# Patient Record
Sex: FEMALE | Race: WHITE | NOT HISPANIC OR LATINO | Employment: FULL TIME | URBAN - METROPOLITAN AREA
[De-identification: names, ages, dates, MRNs, and addresses within clinical notes are randomized per-mention and may not be internally consistent; named-entity substitution may affect disease eponyms.]

---

## 2018-03-07 ENCOUNTER — HISTORICAL (OUTPATIENT)
Dept: LAB | Facility: HOSPITAL | Age: 48
End: 2018-03-07

## 2018-03-07 LAB
ABS NEUT (OLG): 9.19 X10(3)/MCL (ref 2.1–9.2)
ALBUMIN SERPL-MCNC: 4.2 GM/DL (ref 3.4–5)
ALBUMIN/GLOB SERPL: 1.4 {RATIO}
ALP SERPL-CCNC: 111 UNIT/L (ref 38–126)
ALT SERPL-CCNC: 12 UNIT/L (ref 12–78)
APTT PPP: 31.2 SECOND(S) (ref 24.8–36.9)
AST SERPL-CCNC: 4 UNIT/L (ref 15–37)
BASOPHILS # BLD AUTO: 0 X10(3)/MCL (ref 0–0.2)
BASOPHILS NFR BLD AUTO: 0 %
BILIRUB SERPL-MCNC: 0.6 MG/DL (ref 0.2–1)
BILIRUBIN DIRECT+TOT PNL SERPL-MCNC: 0.2 MG/DL (ref 0–0.2)
BILIRUBIN DIRECT+TOT PNL SERPL-MCNC: 0.4 MG/DL (ref 0–0.8)
BUN SERPL-MCNC: 13 MG/DL (ref 7–18)
CALCIUM SERPL-MCNC: 9.7 MG/DL (ref 8.5–10.1)
CHLORIDE SERPL-SCNC: 103 MMOL/L (ref 98–107)
CO2 SERPL-SCNC: 29 MMOL/L (ref 21–32)
CREAT SERPL-MCNC: 0.75 MG/DL (ref 0.55–1.02)
EOSINOPHIL # BLD AUTO: 0.5 X10(3)/MCL (ref 0–0.9)
EOSINOPHIL NFR BLD AUTO: 3 %
ERYTHROCYTE [DISTWIDTH] IN BLOOD BY AUTOMATED COUNT: 15.1 % (ref 11.5–17)
GLOBULIN SER-MCNC: 3 GM/DL (ref 2.4–3.5)
GLUCOSE SERPL-MCNC: 109 MG/DL (ref 74–106)
HCT VFR BLD AUTO: 47 % (ref 37–47)
HGB BLD-MCNC: 16 GM/DL (ref 12–16)
INR PPP: 0.99 (ref 0–1.27)
LYMPHOCYTES # BLD AUTO: 3.8 X10(3)/MCL (ref 0.6–4.6)
LYMPHOCYTES NFR BLD AUTO: 26 %
MCH RBC QN AUTO: 28.8 PG (ref 27–31)
MCHC RBC AUTO-ENTMCNC: 34 GM/DL (ref 33–36)
MCV RBC AUTO: 84.7 FL (ref 80–94)
MONOCYTES # BLD AUTO: 0.8 X10(3)/MCL (ref 0.1–1.3)
MONOCYTES NFR BLD AUTO: 6 %
NEUTROPHILS # BLD AUTO: 9.19 X10(3)/MCL (ref 2.1–9.2)
NEUTROPHILS NFR BLD AUTO: 64 %
PLATELET # BLD AUTO: 308 X10(3)/MCL (ref 130–400)
PMV BLD AUTO: 9.1 FL (ref 9.4–12.4)
POTASSIUM SERPL-SCNC: 4 MMOL/L (ref 3.5–5.1)
PROT SERPL-MCNC: 7.2 GM/DL (ref 6.4–8.2)
PROTHROMBIN TIME: 13.4 SECOND(S) (ref 12.2–14.7)
RBC # BLD AUTO: 5.55 X10(6)/MCL (ref 4.2–5.4)
SODIUM SERPL-SCNC: 139 MMOL/L (ref 136–145)
WBC # SPEC AUTO: 14.4 X10(3)/MCL (ref 4.5–11.5)

## 2018-03-13 ENCOUNTER — HISTORICAL (OUTPATIENT)
Dept: ADMINISTRATIVE | Facility: HOSPITAL | Age: 48
End: 2018-03-13

## 2018-06-24 ENCOUNTER — HOSPITAL ENCOUNTER (OUTPATIENT)
Dept: MEDSURG UNIT | Facility: HOSPITAL | Age: 48
End: 2018-06-25
Attending: INTERNAL MEDICINE | Admitting: INTERNAL MEDICINE

## 2018-08-21 ENCOUNTER — TELEPHONE (OUTPATIENT)
Dept: GASTROENTEROLOGY | Facility: CLINIC | Age: 48
End: 2018-08-21

## 2018-08-30 ENCOUNTER — OFFICE VISIT (OUTPATIENT)
Dept: GASTROENTEROLOGY | Facility: CLINIC | Age: 48
End: 2018-08-30
Payer: COMMERCIAL

## 2018-08-30 VITALS — WEIGHT: 211 LBS

## 2018-08-30 DIAGNOSIS — K52.9 CHRONIC DIARRHEA: Primary | ICD-10-CM

## 2018-08-30 DIAGNOSIS — K86.89 PANCREATIC INSUFFICIENCY: ICD-10-CM

## 2018-08-30 DIAGNOSIS — R10.13 EPIGASTRIC PAIN: ICD-10-CM

## 2018-08-30 PROCEDURE — 99999 PR PBB SHADOW E&M-EST. PATIENT-LVL III: CPT | Mod: PBBFAC,,,

## 2018-08-30 PROCEDURE — 99205 OFFICE O/P NEW HI 60 MIN: CPT | Mod: S$GLB,,, | Performed by: INTERNAL MEDICINE

## 2018-08-30 RX ORDER — METOCLOPRAMIDE 10 MG/1
10 TABLET ORAL 4 TIMES DAILY
COMMUNITY

## 2018-08-30 RX ORDER — OMEPRAZOLE 40 MG/1
40 CAPSULE, DELAYED RELEASE ORAL DAILY
COMMUNITY

## 2018-08-30 RX ORDER — CHOLESTYRAMINE 4 G/9G
4 POWDER, FOR SUSPENSION ORAL
Qty: 270 PACKET | Refills: 3 | Status: SHIPPED | OUTPATIENT
Start: 2018-08-30 | End: 2019-08-30

## 2018-08-30 RX ORDER — DILTIAZEM HYDROCHLORIDE 120 MG/1
180 CAPSULE, COATED, EXTENDED RELEASE ORAL DAILY
COMMUNITY

## 2018-08-30 RX ORDER — HYOSCYAMINE SULFATE 0.12 MG/1
0.12 TABLET SUBLINGUAL EVERY 4 HOURS PRN
COMMUNITY
End: 2022-11-08 | Stop reason: CLARIF

## 2018-08-30 RX ORDER — PROMETHAZINE HYDROCHLORIDE 25 MG/1
25 SUPPOSITORY RECTAL EVERY 12 HOURS PRN
COMMUNITY

## 2018-08-30 RX ORDER — QUETIAPINE 200 MG/1
200 TABLET, FILM COATED, EXTENDED RELEASE ORAL DAILY
COMMUNITY

## 2018-08-30 RX ORDER — FENTANYL 75 UG/H
1 PATCH TRANSDERMAL
COMMUNITY
End: 2022-11-08 | Stop reason: CLARIF

## 2018-08-30 RX ORDER — GLIPIZIDE 5 MG/1
5 TABLET ORAL
COMMUNITY
End: 2019-06-27

## 2018-08-30 RX ORDER — SERTRALINE HYDROCHLORIDE 25 MG/1
25 TABLET, FILM COATED ORAL DAILY
COMMUNITY

## 2018-08-30 RX ORDER — LOSARTAN POTASSIUM AND HYDROCHLOROTHIAZIDE 12.5; 1 MG/1; MG/1
1 TABLET ORAL DAILY
COMMUNITY

## 2018-08-30 RX ORDER — HYDROXYZINE PAMOATE 100 MG/1
100 CAPSULE ORAL 4 TIMES DAILY
COMMUNITY
End: 2019-06-27

## 2018-08-30 NOTE — PROGRESS NOTES
SUBJECTIVE:         Chief Complaint:   Chief Complaint   Patient presents with    Pancreatitis    Abdominal Pain       History of Present Illness:  Patient is a 48 y.o. female presents with abdominal  Pain and diarreha. Her  is with her to  Augment her history.  The symptoms include watery diarrhea that happens as soon as she eats. She can have 5-6 loose watery stools a day.  On occasion she has awoke with a stooling accident.   Onset of symptoms was abrupt starting 1 year ago with unchanged course since that time. Previous studies include CT scan, ultrasound, upper GI and endoscopic US. There have been a myriad of tests done performed.  The patient is aware of some of them but nor compeltely aware of all.  I have some records form clinic visits scanned in but many of the hospital results I don't have.     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 She has been on pancreas supplements for a while with very little improvement.  She had her gallbladder removed 15 years ago and recalls diarrhea for a long time.  She has a spinal stimulator that has been in for 5 and a half years, that life span of which is actually 5 years.  The purpose of the stimulator is to help her with her rectal/ anal tone.            (Not in a hospital admission)    Review of patient's allergies indicates:   Allergen Reactions    Cephalosporins     Demerol [meperidine]     Penicillins     Toradol [ketorolac]     Tramadol         Past Medical History:   Diagnosis Date    Abdominal pain     Acute pancreatitis     Delayed gastric emptying     Duodenitis      Past Surgical History:   Procedure Laterality Date    COLONOSCOPY      UPPER ENDOSCOPIC ULTRASOUND W/ FNA      UPPER GASTROINTESTINAL ENDOSCOPY       Family History   Problem Relation Age of Onset    Lung cancer Maternal Aunt     Colon cancer Maternal Uncle     Prostate cancer Maternal Uncle       Social History     Tobacco Use    Smoking status: Former Smoker     Packs/day: 3.00     Years: 35.00     Pack years: 105.00     Types: Cigarettes     Last attempt to quit: 2018     Years since quittin.1    Smokeless tobacco: Never Used   Substance Use Topics    Alcohol use: No     Frequency: Never    Drug use: No       Review of Systems:  A complete review of systems was performed and other than described in the HPI and below is negative       OBJECTIVE:     Vital Signs (Most Recent)         Diagnostic Results:  I reviwed numerous records      ASSESSMENT/PLAN:   Abdominal pain and diarrhea for over a year    I had a long discussion with the patient and family about what sort of records need to be reviewed by me as well as studies that have not yet been completed by her recollection.    We decided the best course of action would be to have her send records to me for review and come back to clinic in a month.  In the mean time I've started her on cholestyramine and have asked her to get a gastric emptying study    She also needs to contact her surgeon to  Discuss updating her spinal stimulator    I spent 40 minutes with the patient., over 50% of it in coordinating care and counseling the patient

## 2018-08-30 NOTE — PATIENT INSTRUCTIONS
Come back in a month    Get records from [facility] and mail to me    Start Cholestyramine    CT scan on a disk    Gastric emptying study to assess stomach emptying

## 2018-10-15 ENCOUNTER — TELEPHONE (OUTPATIENT)
Dept: GASTROENTEROLOGY | Facility: CLINIC | Age: 48
End: 2018-10-15

## 2018-10-15 NOTE — TELEPHONE ENCOUNTER
Follow up appointment scheduled for 11/15 at 900am with Dr. Murillo.  Patient reports records requested  From [facility] costs over $1000. Will discuss with provider during appointment.   DISPLAY PLAN FREE TEXT DISPLAY PLAN FREE TEXT DISPLAY PLAN FREE TEXT

## 2018-11-15 ENCOUNTER — OFFICE VISIT (OUTPATIENT)
Dept: GASTROENTEROLOGY | Facility: CLINIC | Age: 48
End: 2018-11-15
Payer: COMMERCIAL

## 2018-11-15 VITALS — WEIGHT: 216.5 LBS | SYSTOLIC BLOOD PRESSURE: 112 MMHG | HEART RATE: 97 BPM | DIASTOLIC BLOOD PRESSURE: 69 MMHG

## 2018-11-15 DIAGNOSIS — K52.9 CHRONIC DIARRHEA: Primary | ICD-10-CM

## 2018-11-15 DIAGNOSIS — K30 DELAYED GASTRIC EMPTYING: ICD-10-CM

## 2018-11-15 DIAGNOSIS — R10.13 EPIGASTRIC PAIN: ICD-10-CM

## 2018-11-15 PROCEDURE — 99999 PR PBB SHADOW E&M-EST. PATIENT-LVL III: CPT | Mod: PBBFAC,,,

## 2018-11-15 PROCEDURE — 99214 OFFICE O/P EST MOD 30 MIN: CPT | Mod: S$GLB,,, | Performed by: INTERNAL MEDICINE

## 2018-11-15 NOTE — PROGRESS NOTES
Subjective:       Patient ID: Norma Troy is a 48 y.o. female.    Chief Complaint: Follow-up (loose stool)    Previous records reviewed from those uploaded.  Multiple pages of records personally reviewed by me.    Her pain is now tolerable.  It flares up periodically, can ge to be a 8/10 but has only caused one trip to the ED over the last 6 months.  She recalls now she has been diagnosed with gastroparesis.     Her bowel habits have improved.  She has 1-2 soft bowel movements with no accidents.  She has only an occasional loose stool. This has been all since she started the cholestyramine.    Her past workup included numerous EGD's as well as EUS.  These shoed duodenal inflammation suggesting groove pancreatitis. Other  Imaging suggest the same. She's had a colonoscopy with biopsies for microscopic colitis that were normal        Review of Systems    Objective:      Physical Exam    Assessment:       1. Chronic diarrhea    2. Delayed gastric emptying    3. Epigastric pain        Plan:       She appears to have a combination of gastroparesis and bile salt diarrhea form her cholecystectomy.  Her symptoms appear to be improved with diet observation and restriction and cholestyramine.     She has had a screening colonoscopy.  She would like to follow up with us so we'll have her return in 6 months.  I spent 25 minutes with the patient., over 50% of it in coordinating care and counseling the patient

## 2019-06-27 ENCOUNTER — OFFICE VISIT (OUTPATIENT)
Dept: GASTROENTEROLOGY | Facility: CLINIC | Age: 49
End: 2019-06-27
Payer: COMMERCIAL

## 2019-06-27 VITALS — WEIGHT: 235 LBS

## 2019-06-27 DIAGNOSIS — R10.13 EPIGASTRIC PAIN: Primary | ICD-10-CM

## 2019-06-27 DIAGNOSIS — K90.89 BILE SALT-INDUCED DIARRHEA: ICD-10-CM

## 2019-06-27 PROCEDURE — 99999 PR PBB SHADOW E&M-EST. PATIENT-LVL III: CPT | Mod: PBBFAC,,,

## 2019-06-27 PROCEDURE — 99999 PR PBB SHADOW E&M-EST. PATIENT-LVL III: ICD-10-PCS | Mod: PBBFAC,,,

## 2019-06-27 PROCEDURE — 99214 PR OFFICE/OUTPT VISIT, EST, LEVL IV, 30-39 MIN: ICD-10-PCS | Mod: S$GLB,,, | Performed by: INTERNAL MEDICINE

## 2019-06-27 PROCEDURE — 99214 OFFICE O/P EST MOD 30 MIN: CPT | Mod: S$GLB,,, | Performed by: INTERNAL MEDICINE

## 2019-06-27 RX ORDER — HUMAN INSULIN 100 [USP'U]/ML
INJECTION, SUSPENSION SUBCUTANEOUS DAILY
Refills: 2 | COMMUNITY
Start: 2019-04-01

## 2019-06-27 RX ORDER — ONDANSETRON 8 MG/1
TABLET, ORALLY DISINTEGRATING ORAL
Refills: 3 | COMMUNITY
Start: 2019-03-28

## 2019-06-27 NOTE — PROGRESS NOTES
Subjective:       Patient ID: Norma Troy is a 49 y.o. female.    Chief Complaint: Abdominal Pain    She's doing pretty well with her gastroparesis.  She has not been hospitalized in a year which is a significant improvement. She's only had one ED visit.  She now takes the cholestyramine periodically and is having good results    Recently she had a slight change in her abdominal pain with a transistion of the pain to her left side.    Her A1c still remains a challenge and she is having blood glucose issues for which her endocrinologist is helping manage     Review of Systems    Objective:      Physical Exam    Assessment:       1. Epigastric pain    2. Bile salt-induced diarrhea        Plan:       Persistent abdominal pain with a transition of the pain.  Her A1c is worse and this portends more and persistent trouble with her gastroparesis  We'll proceed with an EGD    Bile salt diarrhea is currently under adequate control.    Her CRC screening is up to date.    I spent 25 minutes with the patient., over 50% of it in coordinating care and counseling the patient

## 2019-06-27 NOTE — PATIENT INSTRUCTIONS
Endoscopy has been scheduled for 7/19/19 with Dr. Elliott.  The lab will call two days before with arrival time.  The test will take place at [facility] [address]. Check in at the first floor at Hospital Admit Desk.  Nothing to eat or drink after midnight the night before.  Only take heart, blood pressure or seizure medications the morning of the procedure.  Do NOT take medications that contain aspirin or ibuprofen.  It is ok to take 81mg baby aspirin.  Please contact the office with any questions.

## 2019-07-14 ENCOUNTER — HISTORICAL (OUTPATIENT)
Dept: EMERGENCY MEDICINE | Facility: HOSPITAL | Age: 49
End: 2019-07-14

## 2019-07-14 LAB
ABS NEUT (OLG): 4.36 X10(3)/MCL (ref 2.1–9.2)
ALBUMIN SERPL-MCNC: 3.8 GM/DL (ref 3.4–5)
ALBUMIN/GLOB SERPL: 1.3 RATIO (ref 1.1–2)
ALP SERPL-CCNC: 129 UNIT/L (ref 46–116)
ALT SERPL-CCNC: 18 UNIT/L (ref 12–78)
AST SERPL-CCNC: 17 UNIT/L (ref 15–37)
BASOPHILS # BLD AUTO: 0 X10(3)/MCL (ref 0–0.2)
BASOPHILS NFR BLD AUTO: 0 %
BILIRUB SERPL-MCNC: 0.4 MG/DL (ref 0.2–1)
BILIRUBIN DIRECT+TOT PNL SERPL-MCNC: 0.08 MG/DL (ref 0–0.2)
BILIRUBIN DIRECT+TOT PNL SERPL-MCNC: 0.32 MG/DL (ref 0–0.8)
BUN SERPL-MCNC: 15.3 MG/DL (ref 7–18)
CALCIUM SERPL-MCNC: 9.3 MG/DL (ref 8.5–10.1)
CHLORIDE SERPL-SCNC: 105 MMOL/L (ref 98–107)
CO2 SERPL-SCNC: 31.1 MMOL/L (ref 21–32)
CREAT SERPL-MCNC: 1.1 MG/DL (ref 0.6–1.3)
EOSINOPHIL # BLD AUTO: 0.3 X10(3)/MCL (ref 0–0.9)
EOSINOPHIL NFR BLD AUTO: 3 %
ERYTHROCYTE [DISTWIDTH] IN BLOOD BY AUTOMATED COUNT: 15.1 % (ref 11.5–17)
GLOBULIN SER-MCNC: 3 GM/DL (ref 2.4–3.5)
GLUCOSE SERPL-MCNC: 132 MG/DL (ref 74–106)
HCT VFR BLD AUTO: 48.2 % (ref 37–47)
HGB BLD-MCNC: 15.4 GM/DL (ref 12–16)
LIPASE SERPL-CCNC: 46 UNIT/L (ref 73–393)
LYMPHOCYTES # BLD AUTO: 3.8 X10(3)/MCL (ref 0.6–4.6)
LYMPHOCYTES NFR BLD AUTO: 41 %
MCH RBC QN AUTO: 29.1 PG (ref 27–31)
MCHC RBC AUTO-ENTMCNC: 32 GM/DL (ref 33–36)
MCV RBC AUTO: 90.9 FL (ref 80–94)
MONOCYTES # BLD AUTO: 0.7 X10(3)/MCL (ref 0.1–1.3)
MONOCYTES NFR BLD AUTO: 8 %
NEUTROPHILS # BLD AUTO: 4.36 X10(3)/MCL (ref 1.4–7.9)
NEUTROPHILS NFR BLD AUTO: 48 %
PLATELET # BLD AUTO: 237 X10(3)/MCL (ref 130–400)
PMV BLD AUTO: 9.6 FL (ref 9.4–12.4)
POTASSIUM SERPL-SCNC: 4.3 MMOL/L (ref 3.5–5.1)
PROT SERPL-MCNC: 6.8 GM/DL (ref 6.4–8.2)
RBC # BLD AUTO: 5.3 X10(6)/MCL (ref 4.2–5.4)
SODIUM SERPL-SCNC: 145 MMOL/L (ref 136–145)
TROPONIN I SERPL-MCNC: <0.02 NG/ML (ref 0.02–0.06)
WBC # SPEC AUTO: 9.2 X10(3)/MCL (ref 4.5–11.5)

## 2019-07-17 ENCOUNTER — TELEPHONE (OUTPATIENT)
Dept: ENDOSCOPY | Facility: HOSPITAL | Age: 49
End: 2019-07-17

## 2019-07-17 NOTE — TELEPHONE ENCOUNTER
Spoke with patient about arrival time @ 0930.     NPO status reviewed: Patient must have nothing to eat after midnight.  Pt may have CLEAR liquids ONLY until completely NPO 3 hrs @ 0630.    Medications: Do not take Insulin or oral diabetic medications the day of the procedure.  Take as prescribed: heart, seizure and blood pressure medication in the morning with a sip of water (less than an ounce).  Take any breathing medications and bring inhalers to hospital with you Leave all valuables and jewelry at home.     Wear comfortable clothes to procedure to change into hospital gown You cannot drive for 24 hours after your procedure because you will receive sedation for your procedure to make you comfortable.  A ride must be provided at discharge.

## 2019-07-19 ENCOUNTER — HOSPITAL ENCOUNTER (OUTPATIENT)
Facility: HOSPITAL | Age: 49
Discharge: HOME | End: 2019-07-19
Attending: INTERNAL MEDICINE | Admitting: INTERNAL MEDICINE
Payer: COMMERCIAL

## 2019-07-19 ENCOUNTER — ANESTHESIA EVENT (OUTPATIENT)
Dept: ENDOSCOPY | Facility: HOSPITAL | Age: 49
End: 2019-07-19
Payer: COMMERCIAL

## 2019-07-19 ENCOUNTER — ANESTHESIA (OUTPATIENT)
Dept: ENDOSCOPY | Facility: HOSPITAL | Age: 49
End: 2019-07-19
Payer: COMMERCIAL

## 2019-07-19 DIAGNOSIS — K86.89 PANCREATIC INSUFFICIENCY: Primary | ICD-10-CM

## 2019-07-19 DIAGNOSIS — K30 DELAYED GASTRIC EMPTYING: ICD-10-CM

## 2019-07-19 DIAGNOSIS — R10.9 ABDOMINAL PAIN: ICD-10-CM

## 2019-07-19 LAB — POCT GLUCOSE: 114 MG/DL (ref 70–110)

## 2019-07-19 PROCEDURE — 37000008 HC ANESTHESIA 1ST 15 MINUTES: Performed by: INTERNAL MEDICINE

## 2019-07-19 PROCEDURE — 63600175 PHARM REV CODE 636 W HCPCS: Performed by: NURSE ANESTHETIST, CERTIFIED REGISTERED

## 2019-07-19 PROCEDURE — 37000009 HC ANESTHESIA EA ADD 15 MINS: Performed by: INTERNAL MEDICINE

## 2019-07-19 PROCEDURE — 82962 GLUCOSE BLOOD TEST: CPT | Performed by: INTERNAL MEDICINE

## 2019-07-19 PROCEDURE — 43235 EGD DIAGNOSTIC BRUSH WASH: CPT | Mod: ,,, | Performed by: INTERNAL MEDICINE

## 2019-07-19 PROCEDURE — 43235 EGD DIAGNOSTIC BRUSH WASH: CPT | Performed by: INTERNAL MEDICINE

## 2019-07-19 PROCEDURE — 25000003 PHARM REV CODE 250: Performed by: INTERNAL MEDICINE

## 2019-07-19 PROCEDURE — 43235 PR EGD, FLEX, DIAGNOSTIC: ICD-10-PCS | Mod: ,,, | Performed by: INTERNAL MEDICINE

## 2019-07-19 RX ORDER — LIDOCAINE HCL/PF 100 MG/5ML
SYRINGE (ML) INTRAVENOUS
Status: DISCONTINUED | OUTPATIENT
Start: 2019-07-19 | End: 2019-07-19

## 2019-07-19 RX ORDER — PROPOFOL 10 MG/ML
VIAL (ML) INTRAVENOUS CONTINUOUS PRN
Status: DISCONTINUED | OUTPATIENT
Start: 2019-07-19 | End: 2019-07-19

## 2019-07-19 RX ORDER — SODIUM CHLORIDE 0.9 % (FLUSH) 0.9 %
10 SYRINGE (ML) INJECTION
Status: DISCONTINUED | OUTPATIENT
Start: 2019-07-19 | End: 2019-07-19 | Stop reason: HOSPADM

## 2019-07-19 RX ORDER — PROPOFOL 10 MG/ML
VIAL (ML) INTRAVENOUS
Status: DISCONTINUED | OUTPATIENT
Start: 2019-07-19 | End: 2019-07-19

## 2019-07-19 RX ORDER — SODIUM CHLORIDE 9 MG/ML
INJECTION, SOLUTION INTRAVENOUS CONTINUOUS
Status: DISCONTINUED | OUTPATIENT
Start: 2019-07-19 | End: 2019-07-19 | Stop reason: HOSPADM

## 2019-07-19 RX ADMIN — PROPOFOL 100 MG: 10 INJECTION, EMULSION INTRAVENOUS at 10:07

## 2019-07-19 RX ADMIN — LIDOCAINE HYDROCHLORIDE 100 MG: 20 INJECTION, SOLUTION INTRAVENOUS at 10:07

## 2019-07-19 RX ADMIN — PROPOFOL 150 MCG/KG/MIN: 10 INJECTION, EMULSION INTRAVENOUS at 10:07

## 2019-07-19 RX ADMIN — SODIUM CHLORIDE: 0.9 INJECTION, SOLUTION INTRAVENOUS at 10:07

## 2019-07-19 NOTE — ANESTHESIA PREPROCEDURE EVALUATION
07/19/2019  Norma Moise is a 49 y.o., female for EGD under MAC    Past Medical History:   Diagnosis Date    Abdominal pain     Acute pancreatitis     Delayed gastric emptying     Duodenitis    smoker      Anesthesia Evaluation    I have reviewed the Patient Summary Reports.    I have reviewed the Nursing Notes.   I have reviewed the Medications.     Review of Systems  Social:  Smoker        Physical Exam  General:  Morbid Obesity    Airway/Jaw/Neck:  Airway Findings: Mallampati: II      Chest/Lungs:  Chest/Lungs Clear    Heart/Vascular:  Heart Findings: Normal            Anesthesia Plan  Type of Anesthesia, risks & benefits discussed:  Anesthesia Type:  MAC  Patient's Preference:   Intra-op Monitoring Plan:   Intra-op Monitoring Plan Comments:   Post Op Pain Control Plan:   Post Op Pain Control Plan Comments:   Induction:    Beta Blocker:  Patient is on a Beta-Blocker and has received one dose within the past 24 hours (No further documentation required).       Informed Consent: Patient understands risks and agrees with Anesthesia plan.  Questions answered. Anesthesia consent signed with patient.  ASA Score: 3     Day of Surgery Review of History & Physical:            Ready For Surgery From Anesthesia Perspective.

## 2019-07-19 NOTE — [employer] PATIENT INSTRUCTIONS
Discharge Summary/Instructions after an Endoscopic Procedure  Patient Name: Norma Moise  Patient MRN: [id number]  Patient YOB: 1970 Friday, July 19, 2019  Satnam Murillo MD  RESTRICTIONS:  During your procedure today, you received medications for sedation.  These   medications may affect your judgment, balance and coordination.  Therefore,   for 24 hours, you have the following restrictions:   - DO NOT drive a car, operate machinery, make legal/financial decisions,   sign important papers or drink alcohol.    ACTIVITY:  Today: no heavy lifting, straining or running due to procedural   sedation/anesthesia.  The following day: return to full activity including work.  DIET:  Eat and drink normally unless instructed otherwise.     TREATMENT FOR COMMON SIDE EFFECTS:  - Mild abdominal pain, nausea, belching, bloating or excessive gas:  rest,   eat lightly and use a heating pad.  - Sore Throat: treat with throat lozenges and/or gargle with warm salt   water.  - Because air was used during the procedure, expelling large amounts of air   from your rectum or belching is normal.  - If a bowel prep was taken, you may not have a bowel movement for 1-3 days.    This is normal.  SYMPTOMS TO WATCH FOR AND REPORT TO YOUR PHYSICIAN:  1. Abdominal pain or bloating, other than gas cramps.  2. Chest pain.  3. Back pain.  4. Signs of infection such as: chills or fever occurring within 24 hours   after the procedure.  5. Rectal bleeding, which would show as bright red, maroon, or black stools.   (A tablespoon of blood from the rectum is not serious, especially if   hemorrhoids are present.)  6. Vomiting.  7. Weakness or dizziness.  GO DIRECTLY TO THE NEAREST EMERGENCY ROOM IF YOU HAVE ANY OF THE FOLLOWING:      Difficulty breathing              Chills and/or fever over 101 F   Persistent vomiting and/or vomiting blood   Severe abdominal pain   Severe chest pain   Black, tarry stools   Bleeding- more than one tablespoon   Any  other symptom or condition that you feel may need urgent attention  Your doctor recommends these additional instructions:  If any biopsies were taken, your doctors clinic will contact you in 1 to 2   weeks with any results.  - Discharge patient to home.   - Resume previous diet.   - Continue present medications.   - Consider performing CT scan (computed tomography) of the abdomen with   contrast if symptoms persist.  For questions, problems or results please call your physician - Satnam Murillo MD at Work:  [phone].  EMERGENCY PHONE NUMBER: [phone],  LAB RESULTS: [phone]  IF A COMPLICATION OR EMERGENCY SITUATION ARISES AND YOU ARE UNABLE TO REACH   YOUR PHYSICIAN - GO DIRECTLY TO THE EMERGENCY ROOM.  Satnam Murillo MD  7/19/2019 10:42:16 AM  This report has been verified and signed electronically.  [employer]

## 2019-07-19 NOTE — ANESTHESIA POSTPROCEDURE EVALUATION
Anesthesia Post Evaluation    Patient: Norma Moise    Procedure(s) Performed: Procedure(s) (LRB):  EGD (ESOPHAGOGASTRODUODENOSCOPY) (N/A)    Final Anesthesia Type: MAC  Patient location during evaluation: GI PACU  Patient participation: Yes- Able to Participate  Level of consciousness: awake and alert and oriented  Post-procedure vital signs: reviewed and stable  Pain management: adequate  Airway patency: patent  PONV status at discharge: No PONV  Anesthetic complications: no      Cardiovascular status: blood pressure returned to baseline  Respiratory status: unassisted, room air and spontaneous ventilation  Hydration status: euvolemic  Follow-up not needed.          Vitals Value Taken Time   /61 7/19/2019  9:48 AM   Temp 37.1 °C (98.8 °F) 7/19/2019  9:48 AM   Pulse 73 7/19/2019  9:48 AM   Resp 16 7/19/2019  9:48 AM   SpO2 96 % 7/19/2019  9:48 AM         No case tracking events are documented in the log.      Pain/Dorothy Score: No data recorded

## 2019-07-19 NOTE — PLAN OF CARE
Admission process complete. Patient ready for procedure. Plan of care reviewed with patient and her spouse. Preoperative fasting appropriate for procedure and sedation. Call light in reach and bed in lowest position.

## 2019-07-19 NOTE — H&P
Short Stay Endoscopy History and Physical    PCP - Uriel Morales MD  Referring Physician - [facility]  No address on file    Procedure - egd  ASA - per anesthesia  Mallampati - per anesthesia  History of Anesthesia problems - no  Family history Anesthesia problems -  no   Plan of anesthesia - General    HPI:  This is a 49 y.o. female here for evaluation of: abdominal pain    Reflux - no  Dysphagia - no  Abdominal pain - no  Diarrhea - no    ROS:  Constitutional: No fevers, chills, No weight loss  CV: No chest pain  Pulm: No cough, No shortness of breath  Ophtho: No vision changes  GI: see HPI  Derm: No rash    Medical History:  has a past medical history of Abdominal pain, Acute pancreatitis, Delayed gastric emptying, and Duodenitis.    Surgical History:  has a past surgical history that includes Colonoscopy; Upper gastrointestinal endoscopy; Upper endoscopic ultrasound w/ FNA; Reconstruction of shoulder (Right); Tonsillectomy; Adenoidectomy; Uvulectomy; Cholecystectomy; Appendectomy; Hysterectomy; Cystoscopy with suprapubic bladder sling; Rectal prolapse repair; Carpal tunnel release; Knee arthroscopy w/ meniscal repair (Left); and Revision of augmentation of bladder.    Family History: family history includes Colon cancer in her maternal uncle; Lung cancer in her maternal aunt; Prostate cancer in her maternal uncle..    Social History:  reports that she has been smoking cigarettes.  She has a 105.00 pack-year smoking history. She has never used smokeless tobacco. She reports that she does not drink alcohol or use drugs.    Review of patient's allergies indicates:   Allergen Reactions    Cephalosporins     Demerol [meperidine]     Penicillins     Toradol [ketorolac]     Tramadol        Medications:   Medications Prior to Admission   Medication Sig Dispense Refill Last Dose    cholestyramine (QUESTRAN) 4 gram packet Take 1 packet (4 g total) by mouth 3 (three) times daily with meals. 270 packet 3  Past Month at Unknown time    diltiaZEM (CARDIZEM CD) 120 MG Cp24 Take 180 mg by mouth once daily.   7/18/2019 at Unknown time    hyoscyamine (LEVSIN/SL) 0.125 mg Subl Place 0.125 mg under the tongue every 4 (four) hours as needed.   7/18/2019 at Unknown time    losartan-hydrochlorothiazide 100-12.5 mg (HYZAAR) 100-12.5 mg Tab Take 1 tablet by mouth once daily.   7/19/2019 at Unknown time    metoclopramide HCl (REGLAN) 10 MG tablet Take 10 mg by mouth 4 (four) times daily.   7/18/2019 at Unknown time    NOVOLIN 70/30 U-100 INSULIN 100 unit/mL (70-30) injection   2 7/18/2019 at Unknown time    omeprazole (PRILOSEC) 40 MG capsule Take 40 mg by mouth once daily.   7/18/2019 at Unknown time    quetiapine 200 mg oral tb24 (SEROQUEL XR) 200 MG Tb24 Take 200 mg by mouth once daily.   7/18/2019 at Unknown time    sertraline (ZOLOFT) 25 MG tablet Take 25 mg by mouth once daily.   7/18/2019 at Unknown time    fentaNYL (DURAGESIC) 75 mcg/hr Place 1 patch onto the skin every 72 hours.   7/17/2019    lipase-protease-amylase (CREON) 36,000-114,000- 180,000 unit CpDR Take by mouth 3 (three) times daily.   More than a month at Unknown time    ondansetron (ZOFRAN-ODT) 8 MG TbDL as needed.  3 7/14/2019    promethazine (PHENERGAN) 25 MG suppository Place 25 mg rectally every 12 (twelve) hours as needed for Nausea.   More than a month at Unknown time       Physical Exam:    Vital Signs:   Vitals:    07/19/19 0948   BP: 109/61   Pulse: 73   Resp: 16   Temp: 98.8 °F (37.1 °C)       General Appearance: Well appearing in no acute distress    Labs:  No results found for: WBC, HGB, HCT, PLT, CHOL, TRIG, HDL, LDLDIRECT, ALT, AST, NA, K, CL, CREATININE, BUN, CO2, TSH, PSA, INR, GLUF, HGBA1C, MICROALBUR    I have explained the risks and benefits of this endoscopic procedure to the patient including but not limited to bleeding, inflammation, infection, perforation, and death.      Satnam Murillo MD

## 2019-07-19 NOTE — TRANSFER OF CARE
"Anesthesia Transfer of Care Note    Patient: Norma Moise    Procedure(s) Performed: Procedure(s) (LRB):  EGD (ESOPHAGOGASTRODUODENOSCOPY) (N/A)    Patient location: GI    Anesthesia Type: MAC    Transport from OR: Transported from OR on room air with adequate spontaneous ventilation    Post pain: adequate analgesia    Post assessment: no apparent anesthetic complications    Post vital signs: stable    Level of consciousness: awake, alert and oriented    Nausea/Vomiting: no nausea/vomiting    Complications: none    Transfer of care protocol was followed      Last vitals:   Visit Vitals  /61 (BP Location: Left arm, Patient Position: Lying)   Pulse 73   Temp 37.1 °C (98.8 °F) (Temporal)   Resp 16   Ht 5' 4" (1.626 m)   Wt 108 kg (238 lb)   SpO2 96%   Breastfeeding? No   BMI 40.85 kg/m²     "

## 2019-07-22 VITALS
BODY MASS INDEX: 40.63 KG/M2 | TEMPERATURE: 99 F | WEIGHT: 238 LBS | DIASTOLIC BLOOD PRESSURE: 74 MMHG | OXYGEN SATURATION: 96 % | HEART RATE: 68 BPM | SYSTOLIC BLOOD PRESSURE: 117 MMHG | HEIGHT: 64 IN | RESPIRATION RATE: 16 BRPM

## 2019-07-28 ENCOUNTER — HISTORICAL (OUTPATIENT)
Dept: EMERGENCY MEDICINE | Facility: HOSPITAL | Age: 49
End: 2019-07-28

## 2019-07-28 LAB
ABS NEUT (OLG): 4.06 X10(3)/MCL (ref 2.1–9.2)
ALBUMIN SERPL-MCNC: 3.6 GM/DL (ref 3.4–5)
ALBUMIN/GLOB SERPL: 1.1 RATIO (ref 1.1–2)
ALP SERPL-CCNC: 118 UNIT/L (ref 46–116)
ALT SERPL-CCNC: 22 UNIT/L (ref 12–78)
AST SERPL-CCNC: 20 UNIT/L (ref 15–37)
BASOPHILS # BLD AUTO: 0 X10(3)/MCL (ref 0–0.2)
BASOPHILS NFR BLD AUTO: 0 %
BILIRUB SERPL-MCNC: 0.4 MG/DL (ref 0.2–1)
BILIRUBIN DIRECT+TOT PNL SERPL-MCNC: 0.04 MG/DL (ref 0–0.2)
BILIRUBIN DIRECT+TOT PNL SERPL-MCNC: 0.36 MG/DL (ref 0–0.8)
BUN SERPL-MCNC: 11.6 MG/DL (ref 7–18)
CALCIUM SERPL-MCNC: 9.3 MG/DL (ref 8.5–10.1)
CHLORIDE SERPL-SCNC: 103 MMOL/L (ref 98–107)
CO2 SERPL-SCNC: 31.9 MMOL/L (ref 21–32)
CREAT SERPL-MCNC: 0.96 MG/DL (ref 0.6–1.3)
EOSINOPHIL # BLD AUTO: 0.3 X10(3)/MCL (ref 0–0.9)
EOSINOPHIL NFR BLD AUTO: 4 %
ERYTHROCYTE [DISTWIDTH] IN BLOOD BY AUTOMATED COUNT: 14.7 % (ref 11.5–17)
GLOBULIN SER-MCNC: 3.3 GM/DL (ref 2.4–3.5)
GLUCOSE SERPL-MCNC: 140 MG/DL (ref 74–106)
HCT VFR BLD AUTO: 47.3 % (ref 37–47)
HGB BLD-MCNC: 15.1 GM/DL (ref 12–16)
IMM GRANULOCYTES # BLD AUTO: 0.03 % (ref 0–0.02)
IMM GRANULOCYTES NFR BLD AUTO: 0.4 % (ref 0–0.43)
LIPASE SERPL-CCNC: 38 UNIT/L (ref 73–393)
LYMPHOCYTES # BLD AUTO: 2.9 X10(3)/MCL (ref 0.6–4.6)
LYMPHOCYTES NFR BLD AUTO: 36 %
MCH RBC QN AUTO: 28.6 PG (ref 27–31)
MCHC RBC AUTO-ENTMCNC: 31.9 GM/DL (ref 33–36)
MCV RBC AUTO: 89.6 FL (ref 80–94)
MONOCYTES # BLD AUTO: 0.7 X10(3)/MCL (ref 0.1–1.3)
MONOCYTES NFR BLD AUTO: 9 %
NEUTROPHILS # BLD AUTO: 4.06 X10(3)/MCL (ref 1.4–7.9)
NEUTROPHILS NFR BLD AUTO: 51 %
PLATELET # BLD AUTO: 225 X10(3)/MCL (ref 130–400)
PMV BLD AUTO: 9.4 FL (ref 9.4–12.4)
POTASSIUM SERPL-SCNC: 4.1 MMOL/L (ref 3.5–5.1)
PROT SERPL-MCNC: 6.9 GM/DL (ref 6.4–8.2)
RBC # BLD AUTO: 5.28 X10(6)/MCL (ref 4.2–5.4)
SODIUM SERPL-SCNC: 142 MMOL/L (ref 136–145)
WBC # SPEC AUTO: 8 X10(3)/MCL (ref 4.5–11.5)

## 2020-10-02 LAB
ABS NEUT (OLG): 6.97 X10(3)/MCL (ref 2.1–9.2)
ALBUMIN SERPL-MCNC: 4 GM/DL (ref 3.5–5)
ALBUMIN/GLOB SERPL: 1.8 RATIO (ref 1.1–2)
ALP SERPL-CCNC: 145 UNIT/L (ref 40–150)
ALT SERPL-CCNC: 14 UNIT/L (ref 0–55)
AST SERPL-CCNC: 12 UNIT/L (ref 5–34)
BASOPHILS # BLD AUTO: 0 X10(3)/MCL (ref 0–0.2)
BASOPHILS NFR BLD AUTO: 0 %
BILIRUB SERPL-MCNC: 0.3 MG/DL
BILIRUBIN DIRECT+TOT PNL SERPL-MCNC: 0.1 MG/DL (ref 0–0.5)
BILIRUBIN DIRECT+TOT PNL SERPL-MCNC: 0.2 MG/DL (ref 0–0.8)
BUN SERPL-MCNC: 11.4 MG/DL (ref 9.8–20.1)
CALCIUM SERPL-MCNC: 8.6 MG/DL (ref 8.4–10.2)
CHLORIDE SERPL-SCNC: 104 MMOL/L (ref 98–107)
CO2 SERPL-SCNC: 24 MMOL/L (ref 22–29)
CREAT SERPL-MCNC: 0.95 MG/DL (ref 0.55–1.02)
EOSINOPHIL # BLD AUTO: 0.3 X10(3)/MCL (ref 0–0.9)
EOSINOPHIL NFR BLD AUTO: 2 %
ERYTHROCYTE [DISTWIDTH] IN BLOOD BY AUTOMATED COUNT: 14.1 % (ref 11.5–17)
GLOBULIN SER-MCNC: 2.2 GM/DL (ref 2.4–3.5)
GLUCOSE SERPL-MCNC: 267 MG/DL (ref 74–100)
HCT VFR BLD AUTO: 46.9 % (ref 37–47)
HGB BLD-MCNC: 15.2 GM/DL (ref 12–16)
INR PPP: 0.9 (ref 0–1.3)
LYMPHOCYTES # BLD AUTO: 2.9 X10(3)/MCL (ref 0.6–4.6)
LYMPHOCYTES NFR BLD AUTO: 27 %
MCH RBC QN AUTO: 29.5 PG (ref 27–31)
MCHC RBC AUTO-ENTMCNC: 32.4 GM/DL (ref 33–36)
MCV RBC AUTO: 91.1 FL (ref 80–94)
MONOCYTES # BLD AUTO: 0.7 X10(3)/MCL (ref 0.1–1.3)
MONOCYTES NFR BLD AUTO: 6 %
NEUTROPHILS # BLD AUTO: 6.97 X10(3)/MCL (ref 2.1–9.2)
NEUTROPHILS NFR BLD AUTO: 64 %
PLATELET # BLD AUTO: 188 X10(3)/MCL (ref 130–400)
PMV BLD AUTO: 10 FL (ref 9.4–12.4)
POTASSIUM SERPL-SCNC: 4.3 MMOL/L (ref 3.5–5.1)
PROT SERPL-MCNC: 6.2 GM/DL (ref 6.4–8.3)
PROTHROMBIN TIME: 11.4 SECOND(S) (ref 11.1–13.7)
RBC # BLD AUTO: 5.15 X10(6)/MCL (ref 4.2–5.4)
SODIUM SERPL-SCNC: 138 MMOL/L (ref 136–145)
WBC # SPEC AUTO: 10.9 X10(3)/MCL (ref 4.5–11.5)

## 2020-11-20 ENCOUNTER — HISTORICAL (OUTPATIENT)
Dept: ADMINISTRATIVE | Facility: HOSPITAL | Age: 50
End: 2020-11-20

## 2021-10-21 PROBLEM — S83.282A ACUTE LATERAL MENISCUS TEAR OF LEFT KNEE: Status: ACTIVE | Noted: 2021-10-21

## 2021-11-04 PROBLEM — E11.65 HYPERGLYCEMIA DUE TO DIABETES MELLITUS: Status: ACTIVE | Noted: 2021-11-04

## 2022-04-10 ENCOUNTER — HISTORICAL (OUTPATIENT)
Dept: ADMINISTRATIVE | Facility: HOSPITAL | Age: 52
End: 2022-04-10
Payer: COMMERCIAL

## 2022-04-26 VITALS
WEIGHT: 214.94 LBS | BODY MASS INDEX: 36.7 KG/M2 | SYSTOLIC BLOOD PRESSURE: 107 MMHG | DIASTOLIC BLOOD PRESSURE: 57 MMHG | HEIGHT: 64 IN

## 2022-04-30 NOTE — H&P
Patient:   Norma Moise            MRN: [id number]            FIN: [id number]               Age:   48 years     Sex:  Female     :  1970   Associated Diagnoses:   None   Author:   Alexa Kyle MD      History of Present Illness   48 yoWF with recurrent acute pancreatitis, groove pancreatitis, chronic abdominal pain from this here for an EUS. She first presented in 2017 with epigastric pain, n/v. CT at that time suggested duodenitis with pancreatitis not excluded. There was no biliary ductal dilation noted. She underwent EGD by Dr. Alexa Kyle on 17 with findings of duodenitis and edema involving the bulb and c sweep. It was thought that her overall picture c/w groove pancreatitis. During that admit her lipase remained wnl and only ALP was elevated at around 159, with tbili and other lfts remaining wnl. Her pain improved and she was discharged. She re-presented in Dec 2017 with continued abdominal pain, n/v, with decreased appetite. Repeat CT showed persistent but decreased wall thickening and inflammatory changes centered at the second portion of duodenum suggesting duodenitis.  Lipase remained nml. LFTs nml with exception of mildly elevated Alk Phos 160. She had a slight leukocytosis which resolved.   She was treated symptomatically.    Of note, she was found to be smoking in her hospital room per nursing report that admit.  She re-presented to ED on 18 with abd pain, n/v/d.  Lipase wnl.  CT at that time with hepatomegaly and normal appearing pancreas.  Patient was d/c'ed from ED that day with phenergan and hycet for analgesia.      Patient re-presented on 18 with epigastric pain radiating to her back, n/v.  On presentation, afebrile, HR 99, /86, wbc 13,500, lfts wnl except alk phos 149, lipase wnl.  CT abd/pelv w contrast noted hepatomegaly, cholecystectomy, similar mild intra and extra hepatic biliary ductal dilation, pancreas unremarakable, no acute  abdominopelvic findings.  Had some LFT elevation that improved. She did undergo repeat egd on 1/22 with findings of normal esophagus, erythematous mucosa of of the gastric body and antrum, sp bx, and duodenitis, sp bx.  Path negative for hpylori.     She presented back on January 30th with nausea, vomiting, abdominal pain,and diarrhea. Upon arrival on 1/29 labs showed mild lekocytosis with wbc at 12.1. ALP elevated at 187, with other LFTs and liapse remain wnl. CT scan reported possibly some mild colitis.    SHe reports her abdominal pain as a shart stabbing pain in her epigastric region that radiates to her back and to the RUQ. She also reports having lower abdominal cramping and she reports feeling as though she has an upset stomach. She reports her bowel habits at base line are three bms daily with stools being loose in consistency. She denies any rectal bleeding or pain. She reports weight loss of 60 pounds since November. She has finally stopped smoking.  She also reports that in May of last year she was seen at an outside facility where she was dignosed with pancreaitis thought to be secondary to diabetic medication Januvia which was d/c.          Review of Systems   All other systems are negative      Health Status   Allergies:    Allergies (5) Active Reaction  cephalosporins anaphalaxis  Demerol HCl unknown  penicillins anaphalaxis  Toradol anaphalaxis  traMADol anaphalaxis     Current medications:  (Selected)   Inpatient Medications  Ordered  acetaminophen: 1,000 mg, form: Infusion, IV Piggyback, Once, Infuse over: 15 minute(s), first dose 03/13/18 8:00:00 CDT, stop date 03/13/18 8:00:00 CDT  Pending Complete  midazolam: 2 mg, form: Injection, IV Push, q5min, Order duration: 2 dose(s), first dose 03/13/18 7:29:00 CDT, stop date 03/13/18 7:38:00 CDT, (up to 5 mg for moderate anxiety)  Prescriptions  Prescribed  Levsin SL 0.125 mg sublingual tablet: 0.125 mg = 1 tab(s), SL, q4hr, # 30 tab(s), 0 Refill(s),  Pharmacy: [id number]  Phenergan 12.5 mg rectal suppository: 12.5 mg = 1 supp, MI, BID, PRN PRN for nausea/vomiting, # 40 supp, 0 Refill(s)  Phenergan 25 mg Tab: 25 mg = 1 tab(s), Oral, QID, PRN PRN nausea, # 20 tab(s), 0 Refill(s), Pharmacy: [facility] [id number]  Zofran ODT 8 mg oral tablet, disintegratin mg = 1 tab(s), Oral, q8hr, # 10 tab(s), 0 Refill(s), Pharmacy: [id number]  Documented Medications  Documented  CARTIA  MG CP24: 120 mg = 1 cap(s), Oral, Daily  CREON DR 36,000 UNITS CAPSULE: See Instructions, 2 cap with meals and 1 with snacks  GLIPIZIDE 5 MG TABLET: 5 mg = 1 tab(s), Oral, Daily  HYDROXYZINE PAMOATE 50 MG CAPS: 50 mg = 1 cap(s), Oral, At Bedtime, 1-2 capsules po at bedtime  OMEPRAZOLE DR 40 MG CAPSULE: 40 mg = 1 cap(s), Oral, Daily  SERTRALINE HCL 25 MG TABLET: 25 mg = 1 tab(s), Oral, Daily  Seroquel 200 mg oral tablet: 200 mg, Oral, At Bedtime, 0 Refill(s)  losartan 100 mg oral tablet: 100 mg = 1 tab(s), Oral, Daily, # 30 tab(s), 0 Refill(s)      Histories   Past Medical History:    Active  HTN - Hypertension ([id number])  DM - Diabetes mellitus ([id number])  Resolved  Abdominal hernia ([id number]):  Resolved.   Procedure history:    Endoscopic Ultrasonography (Upper, Upper, Upper) on 3/13/2018 at 48 Years.  Comments:  3/13/2018 09:20 - Odilia Noel RN  auto-populated from documented surgical case  Biopsy Gastrointestinal (Upper, Upper, Upper) on 3/13/2018 at 48 Years.  Comments:  3/13/2018 09:20 - Odilia Noel RN  auto-populated from documented surgical case  Esophagogastroduodenoscopy (Upper, Upper, Upper) on 3/13/2018 at 48 Years.  Comments:  3/13/2018 09:20 - Sadie GRUBBS, Odilia Langston  auto-populated from documented surgical case  Colonoscopy on 2018 at 48 Years.  Comments:  2018 12:45 - Otoniel GRUBBS, Marlyn RAMIREZ  auto-populated from documented surgical case  Biopsy Gastrointestinal on 2018 at 48 Years.  Comments:  2018 12:45 -  Otoniel GRUBBS, Marlyn RAMIREZ  auto-populated from documented surgical case  Biopsy Gastrointestinal on 1/22/2018 at 47 Years.  Comments:  1/22/2018 10:27 - Cheo Watkins RN  auto-populated from documented surgical case  Esophagogastroduodenoscopy on 1/22/2018 at 47 Years.  Comments:  1/22/2018 10:27 - Cheo Watkins RN  auto-populated from documented surgical case  Esophagogastroduodenoscopy on 11/20/2017 at 47 Years.  Comments:  11/20/2017 15:09 - Cheo Watkins RN  auto-populated from documented surgical case  Repair of multiple tears of rotator cuff of shoulder ([id number]).  Comments:  11/20/2017 00:06 - LeJeune RN, Alaina L.  RIGHT  Hand repair ([id number]).  Comments:  11/20/2017 00:07 - LeJeune RN, Alaina L.  LEFT AND RIGHT  Decompression of lumbar spine (540134641).  Implantation of neurostimulator in spine ([id number]).  Comments:  11/20/2017 00:10 - LeJeune RN, Alaina L.  RIGHT SIDE  Cholecystectomy (96098923).  Appendectomy (336124238).  Bilateral tubal ligation (055108108).  Partial hysterectomy (2769208364).  Bilateral salpingectomy (3101040678).  Procedure on tonsils (5151413433).  Reconstruction of defect of nasal sinus (995022527).  Comments:  11/20/2017 00:13 - LeJeune RN, Alaina L.  DEVIATED SEPTOM  Repair of bladder ([id number]).  Comments:  11/20/2017 00:14 - LeJeune RN, Alaina L.  ACCIDENTAL LACERATION OF THE BLADDER  Repair of vault of vagina with mesh using vaginal approach ([id number]).  Laparoscopic repair of hernia (9820497306).  ACL - Reconstruction of anterior cruciate ligament (347791795).  Comments:  11/20/2017 00:15 - LeJeune RN, Alaina L.  LEFT  Repair of bladder neck ([id number]).  sx on right toe.   Social History        Social & Psychosocial Habits    Alcohol  03/07/2018  Use: Past    Comment: quit 10 years ago - 03/07/2018 09:33 - Dontrell GRUBBS, Marlyn Fajardo    Home/Environment  06/07/2016  Lives with: Significant other    Living situation: Home/Independent    Substance  Abuse  06/07/2016  Use: Never    Tobacco  01/17/2018  Use: Current every day smoker    Tobacco use per day: 35  .        Physical Examination      No qualifying data available   General:  Alert and oriented, No acute distress.         Appearance: Well nourished.    Eye:  Normal conjunctiva.    Respiratory:  Lungs are clear to auscultation, Respirations are non-labored, Breath sounds are equal.    Cardiovascular:  Normal rate, Regular rhythm, No murmur, No edema.    Gastrointestinal:  Soft, Non-tender, Non-distended, Normal bowel sounds.    Integumentary:  Warm, Pink, No pallor, No rash.    Neurologic:  Alert, Oriented, No focal deficits.    Psychiatric:  Cooperative, Appropriate mood & affect, Normal judgment.       Impression and Plan   48 yoWF with recurrent acute pancreatitis, groove pancreatitis, chronic abdominal pain from this here for an EUS.

## 2022-04-30 NOTE — ED PROVIDER NOTES
Patient:   Norma Moise            MRN: [id number]            FIN: [id number]               Age:   49 years     Sex:  Female     :  1970   Associated Diagnoses:   Chronic abdominal pain   Author:   Baldemar Stanton MD      Basic Information   Time seen: Date & time 2019 10:47:00.   History source: Patient.   Arrival mode: Private vehicle.   History limitation: None.   Additional information: Chief Complaint from Nursing Triage Note : Chief Complaint   2019 10:36 CDT      Chief Complaint           uppper abdominal pain vomiting diarrhea for two days  .   Provider/Visit info:   Time Seen:  Baldemar Stanton MD / 2019 10:38  .   History of Present Illness   The patient presents with abdominal pain and vomiting and diarrhea.  The onset was 2  days ago.  The course/duration of symptoms is constant.  The character of symptoms is crampy, sharp and pressure.  The degree at onset was severe, 10 /10.  The Location of pain at onset was upper and abdominal.  The degree at present is severe, 10 /10.  The Location of pain at present is upper, abdominal and mid epigastric.  Radiating pain: none. The exacerbating factor is none.  The relieving factor is none.  Therapy today: none.  Risk factors consist of none.  Associated symptoms: nausea, vomiting and diarrhea.        Review of Systems   Gastrointestinal symptoms:  Abdominal pain, nausea, vomiting, diarrhea.              Additional review of systems information: All other systems reviewed and otherwise negative.      Health Status   Allergies:    Allergic Reactions (Selected)  Severity Not Documented  Cephalosporins- Anaphalaxis.  Demerol HCl- Unknown.  Penicillins- Anaphalaxis.  Toradol- Anaphalaxis.  TraMADol- Anaphalaxis..   Medications:  (Selected)   Prescriptions  Prescribed  Duragesic-75 transdermal film, extended release: 1 patch(es), TOP, q72hr, # 10 patch(es), 0 Refill(s), Pharmacy: [id number]  ProAir HFA 90 mcg/inh inhalation  aerosol with adapter: 2 puff(s), INH, q4hr, PRN PRN for wheezing, # 1 EA, 0 Refill(s), Pharmacy: [id number]  Zofran 4 mg oral tablet: 4 mg = 1 tab(s), Oral, q8hr, # 15 tab(s), 0 Refill(s), Pharmacy: [facility] [id number]  Documented Medications  Documented  CARTIA  MG CP24: 120 mg = 1 cap(s), Oral, Daily  HYDROCODONE-ACETAMIN 7.5-325:   LOSARTAN-HCTZ 100-12.5 MG TAB: 1 tab(s), Daily  Lipitor 40 mg oral tablet: 40 mg = 1 tab(s), Oral, Daily, 0 Refill(s)  OMEPRAZOLE DR 40 MG CAPSULE: 40 mg = 1 cap(s), Daily  RELION NOVOLIN 70-30 VIAL:   SERTRALINE HCL 25 MG TABLET: 25 mg = 1 tab(s), Oral, Daily  Seroquel 200 mg oral tablet: 200 mg, Oral, At Bedtime, 0 Refill(s)  albuterol-ipratropium 2.5 mg-0.5 mg/3 mL inhalation solution:   hyoscyamine 0.125 mg sublingual tablet: 0.125 mg = 1 tab(s), SL, q4hr, PRN PRN other (see comment), 0 Refill(s)  losartan 100 mg oral tablet: 100 mg = 1 tab(s), Oral, Daily, # 30 tab(s), 0 Refill(s)  metoclopramide 10 mg oral tablet: 10 mg = 1 tab(s), Oral, BID  , per nurse's notes.      Past Medical/ Family/ Social History   Medical history:    Active  HTN - Hypertension ([id number])  DM - Diabetes mellitus (694916839)  Resolved  Abdominal hernia (784856477):  Resolved., Reviewed as documented in chart.   Surgical history:    Esophagogastroduodenoscopy on 6/13/2018 at 48 Years.  Comments:  6/13/2018 12:11 ROJELIO Frederick RN, Marlyn RAMIREZ  auto-populated from documented surgical case  Endoscopic Ultrasonography (Upper, Upper, Upper) on 3/13/2018 at 48 Years.  Comments:  3/13/2018 9:20 Odilia Elliott RN  auto-populated from documented surgical case  Biopsy Gastrointestinal (Upper, Upper, Upper) on 3/13/2018 at 48 Years.  Comments:  3/13/2018 9:20 Odilia Elliott RN  auto-populated from documented surgical case  Esophagogastroduodenoscopy (Upper, Upper, Upper) on 3/13/2018 at 48 Years.  Comments:  3/13/2018 9:20 Odilia Elliott RN  Kian  auto-populated from documented surgical case  Colonoscopy on 2/1/2018 at 48 Years.  Comments:  2/1/2018 12:45 Marlyn Villarreal RN  auto-populated from documented surgical case  Biopsy Gastrointestinal on 2/1/2018 at 48 Years.  Comments:  2/1/2018 12:45 Marlyn Villarreal RN  auto-populated from documented surgical case  Biopsy Gastrointestinal on 1/22/2018 at 47 Years.  Comments:  1/22/2018 10:27 Cheo Tsai RN  auto-populated from documented surgical case  Esophagogastroduodenoscopy on 1/22/2018 at 47 Years.  Comments:  1/22/2018 10:27 Cheo Tsai RN  auto-populated from documented surgical case  Esophagogastroduodenoscopy on 11/20/2017 at 47 Years.  Comments:  11/20/2017 15:09 Cheo Tsai RN  auto-populated from documented surgical case  Repair of multiple tears of rotator cuff of shoulder ([id number]).  Comments:  11/20/2017 0:06 CST - LeJeune RN, Alaina L.  RIGHT  Hand repair (519175304).  Comments:  11/20/2017 0:07 CST - LeJeune RN, Alaina L.  LEFT AND RIGHT  Decompression of lumbar spine (618832628).  Implantation of neurostimulator in spine (3490491).  Comments:  11/20/2017 0:10 CST - LeJeune RN, Alaina L.  RIGHT SIDE  Cholecystectomy (94060175).  Appendectomy (714579156).  Bilateral tubal ligation (460359591).  Partial hysterectomy (8311658602).  Bilateral salpingectomy (1481003161).  Procedure on tonsils (1227961296).  Reconstruction of defect of nasal sinus (286467208).  Comments:  11/20/2017 0:13 CST - LeJeune RN, Alaina L.  DEVIATED SEPTOM  Repair of bladder (005496925).  Comments:  11/20/2017 0:14 CST - LeJeune RN, Alaina L.  ACCIDENTAL LACERATION OF THE BLADDER  Repair of vault of vagina with mesh using vaginal approach (3642155551).  Laparoscopic repair of hernia (6868237808).  ACL - Reconstruction of anterior cruciate ligament (899520025).  Comments:  11/20/2017 0:15 CST - LeJeune RN, Cindi AARON.  LEFT  Repair of bladder neck  (16815514).  sx on right toe.  left knee repair., Reviewed as documented in chart.   Family history:    Diabetes mellitus type 2  Father  Mother  Congestive heart disease.  Father  Hypertension.  Mother  , Reviewed as documented in chart.   Social history:    Social & Psychosocial Habits    Alcohol  03/07/2018  Use: Past    Comment: quit 10 years ago - 03/07/2018 09:33 - Dontrell GRUBBS, Marlyn Fajardo    04/06/2019  Use: Past    Home/Environment  06/07/2016  Lives with: Significant other    Living situation: Home/Independent    Substance Use  06/07/2016  Use: Never    Tobacco  07/14/2019  Use: 10 or more cigarettes (1/    Type: Cigarettes    Patient Wants Consult For Cessation Counseling No    Tobacco use per day: 20    07/28/2019  Use: 10 or more cigarettes (1/    Patient Wants Consult For Cessation Counseling No    Abuse/Neglect  07/28/2019  SHX Any signs of abuse or neglect No  , Alcohol use: Denies, Tobacco use: Regularly, 10 cigarettes per day, Drug use: Denies.   Problem list:    Active Problems (20)  Acute disease or injury-related malnutrition   Anxiety   Arthritis   Back pain   Bipolar   Chronic disease-related malnutrition   Depression   Diverticulitis   DM - Diabetes mellitus   DM - Diabetes mellitus   Emphysema   GERD - Gastro-esophageal reflux disease   HTN - Hypertension   HTN - Hypertension   Kidney   Morbid obesity   Neck pain   Pancreatitis   Panic attack   Tobacco user   , per nurse's notes.      Physical Examination               Vital Signs   Vital Signs   7/28/2019 10:36 CDT      Temperature Oral          36.7 DegC                             Temperature Oral (calculated)             98.06 DegF                             Peripheral Pulse Rate     93 bpm                             Respiratory Rate          20 br/min                             SpO2                      95 %                             Systolic Blood Pressure   143 mmHg  HI                             Diastolic Blood Pressure  74 mmHg   .   Measurements   7/28/2019 10:36 CDT      Weight Dosing             108 kg                             Weight Measured           108 kg                             Weight Measured and Calculated in Lbs     238.10 lb                             Height/Length Dosing      162 cm                             Height/Length Measured    162 cm                             Body Mass Index Measured  41.15 kg/m2  .   Basic Oxygen Information   7/28/2019 10:36 CDT      SpO2                      95 %  .   General:  Alert, moderate distress, anxious.    Skin:  Warm, dry, intact.    Head:  Normocephalic, atraumatic.    Neck:  Supple, trachea midline, no tenderness, no JVD, no carotid bruit.    Eye:  Pupils are equal, round and reactive to light, extraocular movements are intact.    Ears, nose, mouth and throat:  Tympanic membranes clear, oral mucosa moist, no pharyngeal erythema or exudate.    Cardiovascular:  Regular rate and rhythm, No murmur, Normal peripheral perfusion, No edema, no Tachycardia, no Bradycardia.    Respiratory:  Lungs are clear to auscultation, respirations are non-labored, breath sounds are equal.    Gastrointestinal:  Soft, Non distended, No organomegaly, Tenderness: Moderate, epigastric, Guarding: Negative, Rebound: Negative, Bowel sounds: Normal.    Genitourinary   Back:  Nontender, Normal range of motion, Normal alignment.    Musculoskeletal:  Normal ROM, normal strength, no tenderness, no swelling, no deformity.    Neurological:  Alert and oriented to person, place, time, and situation, No focal neurological deficit observed, CN II-XII intact, normal sensory observed, normal motor observed, normal speech observed, normal coordination observed.    Lymphatics:  No lymphadenopathy.   Psychiatric:  Cooperative, appropriate mood & affect, normal judgment.       Medical Decision Making   Differential Diagnosis:  Abdominal pain.   Orders  Launch Orders   Laboratory:  Lipase Level (Order): Stat collect,  7/28/2019 10:49 CDT, Blood, Once, Stop date 7/28/2019 10:49 CDT, Lab Collect, 7/28/2019 10:49 CDT  CMP (Order): Stat collect, 7/28/2019 10:49 CDT, Blood, Lab Collect, 7/28/2019 10:49 CDT  CBC w/ Auto Diff (Order): Now collect, 7/28/2019 10:49 CDT, Blood, Lab Collect, 7/28/2019 10:49 CDT  Patient Care:  Saline Lock Insert (Order): 7/28/2019 10:50 CDT  Pharmacy:  morphine 4 mg/mL preservative-free intravenous solution (Order): 8 mg, form: Injection, IV Push, Once, first dose 7/28/2019 10:51 CDT, stop date 7/28/2019 10:51 CDT, STAT, ( > 7 on pain scale)  Normal Saline (0.9% NS) IV 1,000 mL (Order): 1,000 mL, 1,000 mL, IV, 150 mL/hr, start date 7/28/2019 10:50 CDT, 2.1, m2, Launch Orders   Pharmacy:  Zofran 2 mg/mL injectable solution (Order): 8 mg, form: Injection, IV Push, Once, first dose 7/28/2019 10:56 CDT, stop date 7/28/2019 10:56 CDT, STAT  .    Results review:  Lab results : Lab View   7/28/2019 10:57 CDT      Sodium Lvl                142 mmol/L                             Potassium Lvl             4.1 mmol/L                             Chloride                  103 mmol/L                             CO2                       31.9 mmol/L                             Calcium Lvl               9.3 mg/dL                             Glucose Lvl               140 mg/dL  HI                             BUN                       11.6 mg/dL                             Creatinine                0.96 mg/dL                             eGFR-AA                   >60 mL/min/1.73 m2  NA                             eGFR-STONE                  >60 mL/min/1.73 m2  NA                             Bili Total                0.4 mg/dL                             Bili Direct               0.04 mg/dL                             Bili Indirect             0.36 mg/dL                             AST                       20 unit/L                             ALT                       22 unit/L                             Alk Phos                   118 unit/L  HI                             Total Protein             6.9 gm/dL                             Albumin Lvl               3.60 gm/dL                             Globulin                  3.30 gm/dL                             A/G Ratio                 1.1 ratio                             Lipase Lvl                38 unit/L  LOW                             WBC                       8.0 x10(3)/mcL                             RBC                       5.28 x10(6)/mcL                             Hgb                       15.1 gm/dL                             Hct                       47.3 %  HI                             Platelet                  225 x10(3)/mcL                             MCV                       89.6 fL                             MCH                       28.6 pg                             MCHC                      31.9 gm/dL  LOW                             RDW                       14.7 %                             MPV                       9.4 fL                             Abs Neut                  4.06 x10(3)/mcL                             Neutro Auto               51 %  NA                             Lymph Auto                36 %  NA                             Mono Auto                 9 %  NA                             Eos Auto                  4 %  NA                             Abs Eos                   0.3 x10(3)/mcL                             Basophil Auto             0 %  NA                             Abs Neutro                4.06 x10(3)/mcL                             Abs Lymph                 2.9 x10(3)/mcL                             Abs Mono                  0.7 x10(3)/mcL                             Abs Baso                  0.0 x10(3)/mcL                             IG%                       0.400 %                             IG#                       0.0300 %  HI    .      Reexamination/ Reevaluation   Feeling better after meds. Has an appointment with  Dr De Leon this week. Has had 12 prior CT's of her abdomen. Discussed with patient and will not repeat today.       Impression and Plan   Diagnosis   Chronic abdominal pain ([id number] R10.9)   Plan   Condition: Stable.    Disposition: Discharged: Time  7/28/2019 11:57:00, to home.    Patient was given the following educational materials: Abdominal Pain, Adult, Chronic Pain, Adult.    Follow up with: Non Staff Physician MD; ALONDRA De Leon Keep scheduled appointment.    Counseled: Patient, Family, Regarding diagnosis, Regarding diagnostic results, Regarding treatment plan, Patient indicated understanding of instructions.
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
## 2022-05-03 NOTE — HISTORICAL [facility] CERNER
This is a historical note converted from [place]. Formatting and pictures may have been removed.  Please reference [facility] for original formatting and attached multimedia. History of Present Illness  Ms. Moise is a 50 year old  female with chronic pancreatitis, h/o diverticulitis, diabetes mellitus, hypertension, chronic pain, anxiety, and GERD?is?here?for EUS.? She presents unaccompanied.?  ?   She was hospitalized at [facility]?June 9, 2020 to June 13, 2020?for abdominal pain and intractable nausea and vomiting.? GI was consulted and she was evaluated by BLANCA Tellez?Sandra 10, 2020.? She is known to Dr. KYLE De Leon at [facility] and previously me.? 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 ?She has had several ED presentations and hospital admissions since that time with recurrent symptoms of epigastric pain, nausea, and vomiting. ?Each time had been?with a?clinical picture?consistent with groove pancreatitis and duodenitis?and each time she had been treated with IV fluid resuscitation, NPO with slow diet advancement, antiemetics and analgesics as needed.??  ?   Endoscopy?history is as follows:  EGD by Dr. JOANNE Kyle on 11/20/17 with findings of duodenitis and edema involving c-sweep and bulb. ?  EGD by Dr. JOANNE Kyle on 1/22/17 with findings of duodenitis - bx with mild chronic inflammation. ?  Colonoscopy by Dr. KIRT De Leon on 2/1/18 for diarrhea and abdominal pain was normal with exception of internal hemorrhoids - random colonic bx negative. ?  Upper EUS by Dr. JOANEN Kyle on 3/13/18 found mild chronic pancreatitis mainly in interface of duodenum with no abnormalities of pancreatic duct - again findings c/w duodenitis and groove pancreatitis as suspected. ?After EUS, patient began pancreatic enzymes and recommendations were to continue symptomatic treatment.  ?  She stopped smoking in April 2018.??She?was referred to [facility]?(Dr. Murillo)?in mid 2018 and no respective pancreatic  management was recommended.? AALIYAH did not see her at all between June 2018 to February 2020, either in the hospital or clinic setting.? From October 2017 to July 2018, she lost?70 pounds?(250-180) then went back up to 250 by the end of 2019. ?Within the past few months she had lost 30 pounds.? CT abdomen and pelvis with contrast in October 2019 found no acute abnormality and reported normal pancreas with no sites of bowel inflammation.??CT scan abdomen and pelvis with IV contrast January 22, 2020?revealed unchanged left adrenal nodule and otherwise unremarkable.  ?   She had?began smoking again about 3 months prior to hospitalization.? It seemed symptoms worsened about that time.? EGD 5/26/20 with Dr. KYLE De Leon:?Normal esophagus. ?Patchy discontinuous congestion in the stomach. ?Normal duodenum.? Gastric bx: reactive gastropathy, negative for H.pylori.? Duodenal bx unremarkable.  ?   It was noted that?she had a history of persistently elevated alk phos since November 2017. ?She did have intermittently elevated transaminases in January 2018. ?Hep panel negative. ?No iron overload. ?Ceruloplasmin normal. ?MARIELOS negative. ?F-actin and mitochondrial antibody normal.??She was unable to have MRI/MRCP due to spinal cord stimulator that?was not MRI compatible.  ?   SAILAJA resolved. ?Abdominal pain improved.? Her diet was advanced with good tolerance.? Liver enzymes?down trended.? He was recommended outpatient EUS?and strongly advised?tobacco cessation.? She was recommended to continue supportive care for now with a Whipple as a last resort.? She was ultimately discharged home June 13, 2020.  ?   She reports nausea?with subsequent vomiting?1-2 times per week (nonbilious and nonbloody).? The vomiting is noted within 20 to 25 minutes of eating or drinking?and contains both undigested and digested food particles.? She reports bloating?and fullness.? She takes omeprazole 40 mg daily,?Creon?2 in the morning and 2 at bedtime  secondary to not eating much,?Zofran or Phenergan as needed?for nausea and vomiting,?and Reglan 5 mg twice daily.? She denies fever, chills, odynophagia, or dysphagia.? She has 1-2?formed to soft stools?daily?described as sticky at times.? She denies melena, hematochezia, fecal urgency, fecal incontinence, or pain with defecation.??She continues to smoke 1 pack of cigarettes daily denies alcohol use.  ?  Review of Systems  Comprehensive Review of Systems performed with no exceptions other than as noted in HPI.  ?  Physical Exam  Vitals & Measurements  T:?36.4? ?C (Oral)? HR:?85(Peripheral)? RR:?18? BP:?117/71? SpO2:?94%? WT:?90.3?kg? BMI:?34.17?  General:?well-developed well-nourished in no acute distress  Eye: PERRLA, EOMI, clear conjunctiva  HENT:? oropharynx without erythema/exudate, oropharynx and nasal mucosal surfaces moist  Neck:? no thyromegaly or lymphadenopathy, trachea midline  Respiratory:?symmetrical chest expansion and respiratory effort, clear to auscultation bilaterally  Cardiovascular:?regular rate and rhythm without murmurs, gallops or rubs  Gastrointestinal:?soft, non-tender, non-distended with normal bowel sounds, without masses to palpation  Integumentary: no rashes or skin lesions present  Neurologic: cranial nerves intact, no asterixis, awake, alert, and oriented  Psych: good insight, appropriate mood, normal affect  ?  Assessment/Plan  Ms. Moise is a 50 year old  female with chronic pancreatitis, h/o diverticulitis, diabetes mellitus, hypertension, chronic pain, anxiety, and GERD?is?here?for EUS.?  ?   Risks, benefits, and alternatives of the procedure discussed.?  Will proceed with endoscopic procedure as scheduled.   Problem List/Past Medical History  Ongoing  Acute disease or injury-related malnutrition  Acute on chronic pancreatitis  Anxiety  Arthritis  Back pain  Bipolar  Chronic disease-related malnutrition  Depression  Diverticulitis  DM - Diabetes mellitus  DM - Diabetes  mellitus  Emphysema  GERD - Gastro-esophageal reflux disease  HTN - Hypertension  HTN - Hypertension  Kidney  Morbid obesity  Neck pain  Pancreatitis  Panic attack  Postprocedural delayed gastric emptying  Tobacco user  Historical  Abdominal hernia  Procedure/Surgical History  Endoscopic Ultrasonography (Upper) (11/20/2020)  Esophagogastroduodenoscopy (Upper) (11/20/2020)  Esophagogastroduodenoscopy (06/13/2018)  Inspection of Upper Intestinal Tract, Via Natural or Artificial Opening Endoscopic (06/13/2018)  Biopsy Gastrointestinal (Upper, Upper, Upper) (03/13/2018)  Endoscopic Ultrasonography (Upper, Upper, Upper) (03/13/2018)  Esophagogastroduodenoscopy (Upper, Upper, Upper) (03/13/2018)  Esophagogastroduodenoscopy, flexible, transoral; with biopsy, single or multiple (03/13/2018)  Esophagogastroduodenoscopy, flexible, transoral; with endoscopic ultrasound examination limited to the esophagus, stomach or duodenum, and adjacent structures (03/13/2018)  Excision of Duodenum, Via Natural or Artificial Opening Endoscopic, Diagnostic (03/13/2018)  Inspection of Upper Intestinal Tract, Via Natural or Artificial Opening Endoscopic (03/13/2018)  Ultrasonography of Gastrointestinal Tract (03/13/2018)  Biopsy Gastrointestinal (02/01/2018)  Colonoscopy (02/01/2018)  Excision of Left Large Intestine, Via Natural or Artificial Opening Endoscopic, Diagnostic (02/01/2018)  Excision of Rectum, Via Natural or Artificial Opening Endoscopic, Diagnostic (02/01/2018)  Excision of Right Large Intestine, Via Natural or Artificial Opening Endoscopic, Diagnostic (02/01/2018)  Biopsy Gastrointestinal (01/22/2018)  Esophagogastroduodenoscopy (01/22/2018)  Excision of Duodenum, Via Natural or Artificial Opening Endoscopic, Diagnostic (01/22/2018)  Excision of Stomach, Pylorus, Via Natural or Artificial Opening Endoscopic, Diagnostic (01/22/2018)  Esophagogastroduodenoscopy (11/20/2017)  Inspection of Upper Intestinal Tract, Via Natural or  Artificial Opening Endoscopic (11/20/2017)  ACL - Reconstruction of anterior cruciate ligament  Appendectomy  Bilateral salpingectomy  Bilateral tubal ligation  Cholecystectomy  Decompression of lumbar spine  Hand repair  Implantation of neurostimulator in spine  Laparoscopic repair of hernia  left knee repair  Partial hysterectomy  Procedure on tonsils  Reconstruction of defect of nasal sinus  Repair of bladder  Repair of bladder neck  Repair of multiple tears of rotator cuff of shoulder  Repair of vault of vagina with mesh using vaginal approach  sx on right toe   Medications  Inpatient  Buffered Lidocaine 1% - 1mL syringe, 5 mg= 0.5 mL, ID, As Directed, PRN  Normal Saline (0.9% NS) IV 1,000 mL, 1000 mL, IV  Plasmalyte 1,000 mL, 1000 mL, IV  Home  albuterol-ipratropium 2.5 mg-0.5 mg/3 mL inhalation solution, 2 puff(s), INH, TID, PRN  alPRAzolam 0.25 mg oral tab, 0.25 mg= 1 tab(s), Oral, BID  atorvastatin 40 mg oral tablet, 40 mg= 1 tab(s), Oral, Daily  busPIRone 15 mg oral tablet, 7.5 mg= 0.5 tab(s), Oral, BID,? ?Not taking: Last Dose Date/Time Unknown  Creon 12,000 units oral delayed release capsule, 1 cap(s), Oral, QID, 6 refills,? ?Not taking: Last Dose Date/Time Unknown  Creon 36,000 units oral delayed release capsule, 1 cap(s), Oral, TID  diltiazem 240 mg/24 hours oral CAPsule, extended release, 240 mg= 1 cap(s), Oral, Daily  ergocalciferol 50,000 intUnit oral capsule, 90884 IntUnit= 1 cap(s), Oral, qWeek,? ?Not Taking, Completed Rx: Last Dose Date/Time Unknown  fentaNYL 50 mcg/hr transdermal film, extended release, TOP, q3day  glimepiride 4 mg oral tablet, 4 mg= 1 tab(s), Oral, qAM  hydrochlorothiazide-losartan 12.5 mg-100 mg oral tablet, 1 tab(s), Oral, Daily  HYDROCODONE-ACETAMIN 7.5-325, 1 tab, Oral, Daily  Levsin SL 0.125 mg sublingual tablet, 0.125 mg= 1 tab(s), SL, q4hr, PRN  lidocaine 4% topical film, 1 patch(es), TOP, TID, 1 refills  Melatonin 3 mg oral tablet, 3 mg= 1 tab(s), Oral, Once a day (at  bedtime)  meloxicam 7.5 mg oral tablet, 7.5 mg= 1 tab(s), Oral, Daily  metoclopramide 5 mg oral tablet, 5 mg= 1 tab(s), Oral, QID  Norco 5 mg-325 mg oral tablet, 1 tab(s), Oral, q6hr  OMEPRAZOLE DR 40 MG CAPSULE, 40 mg= 1 cap(s), Oral, Daily  Phenergan 25 mg rectal suppository, 25 mg= 1 supp, IL (rectal), TID, PRN  Phenergan 25 mg Tab, 25 mg= 1 tab(s), Oral, TID, PRN,? ?Not Taking, Completed Rx: Last Dose Date/Time Unknown  ProAir HFA 90 mcg/inh inhalation aerosol with adapter, 2 puff(s), INH, q4hr, PRN,? ?Not taking: Last Dose Date/Time Unknown  Promethazine 25 mg ORAL Tab, 25 mg= 1 tab(s), Oral, q6hr, PRN,? ?Not taking: Last Dose Date/Time Unknown  Robaxin 750 mg oral tablet, 1500 mg= 2 tab(s), Oral, BID  Seroquel 200 mg oral tablet, 200 mg, Oral, At Bedtime  Zofran ODT 4 mg oral tablet, disintegrating, 4 mg= 1 tab(s), Oral, q8hr, PRN  Zofran ODT 8 mg oral tablet, disintegrating, 8 mg, Oral, q6hr, PRN, 1 refills,? ?Not taking: Last Dose Date/Time Unknown  Allergies  Demerol HCl?(unknown)  Toradol?(anaphalaxis)  cephalosporins?(anaphalaxis)  morphine?(rash)  penicillins?(anaphalaxis)  traMADol?(anaphalaxis)  Social History  Abuse/Neglect  No, 11/20/2020  No, Yes, No, 10/06/2020  No, No, Yes, 05/15/2020  No, 04/17/2020  No, 02/28/2020  No, 02/08/2020  No, 01/18/2020  Alcohol  Past, 04/06/2019  Past, 03/07/2018  Home/Environment  Lives with Significant other. Living situation: Home/Independent., 06/07/2016  Spiritual/Cultural  Sabianism, 10/06/2020  Substance Use  Never, Drug use interferes with work/home: No. Household substance abuse concerns: No., 06/10/2020  Never, 06/07/2016  Tobacco  10 or more cigarettes (1/2 pack or more)/day in last 30 days, No, 11/20/2020  10 or more cigarettes (1/2 pack or more)/day in last 30 days, Cigarettes, No, 20 per day. Household tobacco concerns: No., 06/10/2020  10 or more cigarettes (1/2 pack or more)/day in last 30 days, No, 05/15/2020  10 or more cigarettes (1/2 pack or  more)/day in last 30 days, No, 04/17/2020  10 or more cigarettes (1/2 pack or more)/day in last 30 days, No, 02/28/2020  10 or more cigarettes (1/2 pack or more)/day in last 30 days, No, 02/08/2020  10 or more cigarettes (1/2 pack or more)/day in last 30 days, Yes, 20 per day., 01/18/2020  10 or more cigarettes (1/2 pack or more)/day in last 30 days, No, 07/28/2019  Family History  Congestive heart disease.: Father.  Diabetes mellitus type 2: Mother and Father.  Hypertension.: Mother.

## 2022-11-21 PROBLEM — M17.12 ARTHRITIS OF LEFT KNEE: Status: ACTIVE | Noted: 2022-11-21

## 2024-07-09 ENCOUNTER — HOSPITAL ENCOUNTER (OUTPATIENT)
Dept: RADIOLOGY | Facility: HOSPITAL | Age: 54
Discharge: HOME | End: 2024-07-09
Attending: PHYSICIAN ASSISTANT
Payer: COMMERCIAL

## 2024-07-09 DIAGNOSIS — K86.1 CHRONIC PANCREATITIS: ICD-10-CM

## 2024-07-09 DIAGNOSIS — K76.0 FATTY LIVER: ICD-10-CM

## 2024-07-09 PROCEDURE — 74170 CT ABD WO CNTRST FLWD CNTRST: CPT | Mod: TC,PN

## 2024-07-09 PROCEDURE — 25500020 PHARM REV CODE 255: Mod: PN | Performed by: PHYSICIAN ASSISTANT

## 2024-07-09 PROCEDURE — 74170 CT ABD WO CNTRST FLWD CNTRST: CPT | Mod: 26,,, | Performed by: RADIOLOGY

## 2024-07-09 RX ADMIN — IOHEXOL 100 ML: 350 INJECTION, SOLUTION INTRAVENOUS at 11:07

## 2024-10-21 ENCOUNTER — HOSPITAL ENCOUNTER (OUTPATIENT)
Dept: RADIOLOGY | Facility: HOSPITAL | Age: 54
Discharge: HOME | End: 2024-10-21
Attending: INTERNAL MEDICINE
Payer: COMMERCIAL

## 2024-10-21 DIAGNOSIS — K76.0 FATTY LIVER: ICD-10-CM

## 2024-10-21 DIAGNOSIS — K44.9 DIAPHRAGMATIC HERNIA WITHOUT OBSTRUCTION OR GANGRENE: ICD-10-CM

## 2024-10-21 DIAGNOSIS — K86.1 CHRONIC PANCREATITIS: ICD-10-CM

## 2024-10-21 DIAGNOSIS — K31.89 OTHER DISEASES OF STOMACH AND DUODENUM: ICD-10-CM

## 2024-10-21 PROCEDURE — 76700 US EXAM ABDOM COMPLETE: CPT | Mod: 26,,, | Performed by: RADIOLOGY

## 2024-10-21 PROCEDURE — 76700 US EXAM ABDOM COMPLETE: CPT | Mod: TC,PN
